# Patient Record
Sex: MALE | Race: WHITE | NOT HISPANIC OR LATINO | Employment: OTHER | URBAN - METROPOLITAN AREA
[De-identification: names, ages, dates, MRNs, and addresses within clinical notes are randomized per-mention and may not be internally consistent; named-entity substitution may affect disease eponyms.]

---

## 2019-06-14 PROBLEM — E66.01 MORBID OBESITY DUE TO EXCESS CALORIES: Status: ACTIVE | Noted: 2019-03-13

## 2019-06-14 PROBLEM — I10 ESSENTIAL (PRIMARY) HYPERTENSION: Status: ACTIVE | Noted: 2019-06-14

## 2019-06-14 PROBLEM — G47.33 OSA (OBSTRUCTIVE SLEEP APNEA): Status: ACTIVE | Noted: 2019-06-14

## 2019-06-14 PROBLEM — M25.531 WRIST PAIN, ACUTE, RIGHT: Status: ACTIVE | Noted: 2019-06-14

## 2019-07-20 PROBLEM — Z72.0 TOBACCO USE: Status: ACTIVE | Noted: 2019-07-20

## 2019-07-20 PROBLEM — Z71.6 ENCOUNTER FOR SMOKING CESSATION COUNSELING: Status: ACTIVE | Noted: 2019-07-20

## 2020-09-01 PROBLEM — A41.9 SEPSIS DUE TO CELLULITIS: Status: ACTIVE | Noted: 2020-09-01

## 2020-09-01 PROBLEM — L03.90 SEPSIS DUE TO CELLULITIS: Status: ACTIVE | Noted: 2020-09-01

## 2020-09-01 PROBLEM — L03.90 CELLULITIS: Status: ACTIVE | Noted: 2020-09-01

## 2020-09-02 PROBLEM — N17.9 AKI (ACUTE KIDNEY INJURY): Status: ACTIVE | Noted: 2020-09-02

## 2020-09-02 PROBLEM — D72.829 LEUKOCYTOSIS: Status: ACTIVE | Noted: 2020-09-02

## 2020-09-02 PROBLEM — E80.6 HYPERBILIRUBINEMIA: Status: ACTIVE | Noted: 2020-09-02

## 2020-09-02 PROBLEM — E87.20 LACTIC ACIDOSIS: Status: ACTIVE | Noted: 2020-09-02

## 2020-09-02 PROBLEM — R00.0 TACHYCARDIA: Status: ACTIVE | Noted: 2020-09-02

## 2020-09-02 PROBLEM — I95.9 HYPOTENSION, UNSPECIFIED: Status: ACTIVE | Noted: 2020-09-02

## 2020-09-02 PROBLEM — E83.39 HYPOPHOSPHATEMIA: Status: ACTIVE | Noted: 2020-09-02

## 2020-09-02 PROBLEM — N18.30 CKD (CHRONIC KIDNEY DISEASE) STAGE 3, GFR 30-59 ML/MIN: Status: ACTIVE | Noted: 2020-09-02

## 2020-09-02 PROBLEM — E87.1 HYPONATREMIA: Status: ACTIVE | Noted: 2020-09-02

## 2020-09-03 PROBLEM — E83.39 HYPOPHOSPHATEMIA: Status: RESOLVED | Noted: 2020-09-02 | Resolved: 2020-09-03

## 2020-09-03 PROBLEM — D72.829 LEUKOCYTOSIS: Status: RESOLVED | Noted: 2020-09-02 | Resolved: 2020-09-03

## 2020-09-03 PROBLEM — R00.0 TACHYCARDIA: Status: RESOLVED | Noted: 2020-09-02 | Resolved: 2020-09-03

## 2020-09-03 PROBLEM — D69.6 THROMBOCYTOPENIA: Status: ACTIVE | Noted: 2020-09-03

## 2020-09-04 PROBLEM — I95.9 SEVERE HYPOTENSION: Status: RESOLVED | Noted: 2020-09-02 | Resolved: 2020-09-04

## 2020-09-05 PROBLEM — A41.9 SEPSIS DUE TO CELLULITIS: Status: RESOLVED | Noted: 2020-09-01 | Resolved: 2020-09-05

## 2020-09-05 PROBLEM — D64.9 ANEMIA: Status: ACTIVE | Noted: 2020-09-05

## 2020-09-05 PROBLEM — E87.20 LACTIC ACIDOSIS: Status: RESOLVED | Noted: 2020-09-02 | Resolved: 2020-09-05

## 2020-09-05 PROBLEM — N17.9 AKI (ACUTE KIDNEY INJURY): Status: RESOLVED | Noted: 2020-09-02 | Resolved: 2020-09-05

## 2020-09-05 PROBLEM — E80.6 HYPERBILIRUBINEMIA: Status: RESOLVED | Noted: 2020-09-02 | Resolved: 2020-09-05

## 2020-09-05 PROBLEM — L03.90 SEPSIS DUE TO CELLULITIS: Status: RESOLVED | Noted: 2020-09-01 | Resolved: 2020-09-05

## 2020-09-14 PROBLEM — E66.09 OBESITY DUE TO EXCESS CALORIES: Status: ACTIVE | Noted: 2020-09-14

## 2020-09-14 PROBLEM — Z79.01 ANTICOAGULATED ON COUMADIN: Status: ACTIVE | Noted: 2020-09-14

## 2020-09-14 PROBLEM — R55 SYNCOPE AND COLLAPSE: Status: ACTIVE | Noted: 2018-05-12

## 2020-09-14 PROBLEM — E11.8 DIABETIC FOOT: Status: ACTIVE | Noted: 2019-03-13

## 2020-12-14 ENCOUNTER — NURSE TRIAGE (OUTPATIENT)
Dept: ADMINISTRATIVE | Facility: CLINIC | Age: 60
End: 2020-12-14

## 2020-12-15 ENCOUNTER — NURSE TRIAGE (OUTPATIENT)
Dept: ADMINISTRATIVE | Facility: CLINIC | Age: 60
End: 2020-12-15

## 2020-12-15 ENCOUNTER — TELEPHONE (OUTPATIENT)
Dept: ADMINISTRATIVE | Facility: CLINIC | Age: 60
End: 2020-12-15

## 2020-12-15 ENCOUNTER — PATIENT MESSAGE (OUTPATIENT)
Dept: ADMINISTRATIVE | Facility: OTHER | Age: 60
End: 2020-12-15

## 2020-12-15 NOTE — TELEPHONE ENCOUNTER
Pt contacted for No response surveillance escalation - Pt reports he sent in his VS. Reviewed VS with pt and how to enter to my chart lopez - pt reports he can not do the lopez while on a call but he will enter them when he ends this call. Pt also reading pulse ox backwards and educated on how to read pulse ox. Denies any fever or SOB, pt able to speak in full sentences without noted SOB or cough. Info only protocol used and pt advised on home care. Pt instructed to call OOC for worsening of symptoms or health questions.

## 2020-12-15 NOTE — TELEPHONE ENCOUNTER
Pt spoke with PES and completed consent. No additional outreach. Enrollment complete.     Reason for Disposition   Caller has cancelled the call before the first contact    Additional Information   Negative: Caller has already spoken with the PCP (or office), and has no further questions   Negative: Caller has already spoken with another triager and has no further questions   Negative: Caller has already spoken with another triager or PCP (or office), and has further questions and triager able to answer questions.    Protocols used: NO CONTACT OR DUPLICATE CONTACT CALL-A-[place]

## 2020-12-15 NOTE — TELEPHONE ENCOUNTER
Pt called for COVID enrollment he doesn't have an order for a pulse ox but was placed in both programs pt is opting out of the Surveillance because he said he cant work his phone. Pt said that he would do the texting looked in the chart and had an order for both. Pt said that he's doing ok he's waiting to see if he can get the infusion. They are reviewing his case and will let him know will route message to MD about orders. I told him I would call back in the am.    Reason for Disposition   Health Information question, no triage required and triager able to answer question    Protocols used: INFORMATION ONLY CALL - NO TRIAGE-A-

## 2020-12-16 ENCOUNTER — PATIENT MESSAGE (OUTPATIENT)
Dept: ADMINISTRATIVE | Facility: OTHER | Age: 60
End: 2020-12-16

## 2020-12-16 ENCOUNTER — NURSE TRIAGE (OUTPATIENT)
Dept: ADMINISTRATIVE | Facility: CLINIC | Age: 60
End: 2020-12-16

## 2020-12-16 NOTE — TELEPHONE ENCOUNTER
Patient escalated SOB 2/5 at rest. Patient denies feeling SOB of at rest. Patient rating SOB 2/5 with activity only. Speaking in full sentences no SOB noted during phone call. Pt states that he was able to sleep pretty good last night. No further action needed.    Reason for Disposition   Health Information question, no triage required and triager able to answer question    Additional Information   Negative: [1] Caller is not with the adult (patient) AND [2] reporting urgent symptoms   Negative: Lab result questions   Negative: Medication questions   Negative: Caller can't be reached by phone   Negative: Caller has already spoken to PCP or another triager   Negative: RN needs further essential information from caller in order to complete triage   Negative: Requesting regular office appointment   Negative: [1] Caller requesting NON-URGENT health information AND [2] PCP's office is the best resource    Protocols used: INFORMATION ONLY CALL - NO TRIAGE-[facility]

## 2020-12-17 ENCOUNTER — PATIENT MESSAGE (OUTPATIENT)
Dept: ADMINISTRATIVE | Facility: OTHER | Age: 60
End: 2020-12-17

## 2020-12-18 ENCOUNTER — PATIENT MESSAGE (OUTPATIENT)
Dept: ADMINISTRATIVE | Facility: OTHER | Age: 60
End: 2020-12-18

## 2020-12-19 ENCOUNTER — PATIENT MESSAGE (OUTPATIENT)
Dept: ADMINISTRATIVE | Facility: OTHER | Age: 60
End: 2020-12-19

## 2020-12-20 ENCOUNTER — PATIENT MESSAGE (OUTPATIENT)
Dept: ADMINISTRATIVE | Facility: OTHER | Age: 60
End: 2020-12-20

## 2020-12-21 ENCOUNTER — PATIENT MESSAGE (OUTPATIENT)
Dept: ADMINISTRATIVE | Facility: OTHER | Age: 60
End: 2020-12-21

## 2020-12-22 ENCOUNTER — PATIENT MESSAGE (OUTPATIENT)
Dept: ADMINISTRATIVE | Facility: OTHER | Age: 60
End: 2020-12-22

## 2020-12-23 ENCOUNTER — PATIENT MESSAGE (OUTPATIENT)
Dept: ADMINISTRATIVE | Facility: OTHER | Age: 60
End: 2020-12-23

## 2020-12-23 ENCOUNTER — NURSE TRIAGE (OUTPATIENT)
Dept: ADMINISTRATIVE | Facility: CLINIC | Age: 60
End: 2020-12-23

## 2020-12-23 NOTE — TELEPHONE ENCOUNTER
Pt tested positive for CV since 12/12. Has no symptoms at all. Questions about retesting and ending isolation. Explained  re-testing for 'clearance is not recommended. We use a symptom or time-based method to determine when someone is no longer infectious and can stop isolation:if  Symptoms do not require hospitalization you can end isolation at a minimum of 10 days from symptom onset. End isolation after 10 days and 24 hours fever free. Pt VU. I have reviewed the provider's instructions with the patient, answering all questions to his satisfaction.      Reason for Disposition   COVID-19 Home Isolation, questions about    Protocols used: CORONAVIRUS (COVID-19) DIAGNOSED OR [id number]

## 2020-12-24 ENCOUNTER — PATIENT MESSAGE (OUTPATIENT)
Dept: ADMINISTRATIVE | Facility: OTHER | Age: 60
End: 2020-12-24

## 2020-12-25 ENCOUNTER — PATIENT MESSAGE (OUTPATIENT)
Dept: ADMINISTRATIVE | Facility: OTHER | Age: 60
End: 2020-12-25

## 2020-12-26 ENCOUNTER — PATIENT MESSAGE (OUTPATIENT)
Dept: ADMINISTRATIVE | Facility: OTHER | Age: 60
End: 2020-12-26

## 2021-05-12 ENCOUNTER — PATIENT MESSAGE (OUTPATIENT)
Dept: RESEARCH | Facility: HOSPITAL | Age: 61
End: 2021-05-12

## 2021-05-12 PROBLEM — E66.01 MORBID OBESITY DUE TO EXCESS CALORIES: Status: RESOLVED | Noted: 2019-03-13 | Resolved: 2021-05-12

## 2021-05-12 PROBLEM — E66.09 OBESITY DUE TO EXCESS CALORIES: Status: RESOLVED | Noted: 2020-09-14 | Resolved: 2021-05-12

## 2021-05-19 PROBLEM — R21 SKIN RASH: Status: ACTIVE | Noted: 2021-05-19

## 2021-05-19 PROBLEM — R79.89 ELEVATED LACTIC ACID LEVEL: Status: ACTIVE | Noted: 2021-05-19

## 2021-05-19 PROBLEM — Z91.81 H/O FALL: Status: ACTIVE | Noted: 2021-05-19

## 2021-05-20 PROBLEM — N18.31 STAGE 3A CHRONIC KIDNEY DISEASE: Status: ACTIVE | Noted: 2020-09-02

## 2021-05-20 PROBLEM — R79.1 SUPRATHERAPEUTIC INR: Status: ACTIVE | Noted: 2021-05-20
